# Patient Record
Sex: FEMALE | Race: WHITE | NOT HISPANIC OR LATINO | URBAN - METROPOLITAN AREA
[De-identification: names, ages, dates, MRNs, and addresses within clinical notes are randomized per-mention and may not be internally consistent; named-entity substitution may affect disease eponyms.]

---

## 2019-11-28 ENCOUNTER — EMERGENCY (EMERGENCY)
Facility: HOSPITAL | Age: 34
LOS: 1 days | Discharge: HOME | End: 2019-11-28
Admitting: EMERGENCY MEDICINE
Payer: COMMERCIAL

## 2019-11-28 VITALS
WEIGHT: 119.93 LBS | TEMPERATURE: 98 F | OXYGEN SATURATION: 90 % | HEART RATE: 133 BPM | HEIGHT: 63 IN | DIASTOLIC BLOOD PRESSURE: 99 MMHG | RESPIRATION RATE: 19 BRPM | SYSTOLIC BLOOD PRESSURE: 142 MMHG

## 2019-11-28 VITALS
SYSTOLIC BLOOD PRESSURE: 121 MMHG | DIASTOLIC BLOOD PRESSURE: 83 MMHG | HEART RATE: 101 BPM | TEMPERATURE: 98 F | OXYGEN SATURATION: 98 % | RESPIRATION RATE: 18 BRPM

## 2019-11-28 DIAGNOSIS — F19.10 OTHER PSYCHOACTIVE SUBSTANCE ABUSE, UNCOMPLICATED: ICD-10-CM

## 2019-11-28 LAB
ALBUMIN SERPL ELPH-MCNC: 4.2 G/DL — SIGNIFICANT CHANGE UP (ref 3.4–5)
ALP SERPL-CCNC: 49 U/L — SIGNIFICANT CHANGE UP (ref 40–120)
ALT FLD-CCNC: 22 U/L — SIGNIFICANT CHANGE UP (ref 12–42)
ANION GAP SERPL CALC-SCNC: 14 MMOL/L — SIGNIFICANT CHANGE UP (ref 9–16)
AST SERPL-CCNC: 17 U/L — SIGNIFICANT CHANGE UP (ref 15–37)
BASOPHILS # BLD AUTO: 0.07 K/UL — SIGNIFICANT CHANGE UP (ref 0–0.2)
BASOPHILS NFR BLD AUTO: 1.1 % — SIGNIFICANT CHANGE UP (ref 0–2)
BILIRUB SERPL-MCNC: 0.4 MG/DL — SIGNIFICANT CHANGE UP (ref 0.2–1.2)
BUN SERPL-MCNC: 13 MG/DL — SIGNIFICANT CHANGE UP (ref 7–23)
CALCIUM SERPL-MCNC: 8.8 MG/DL — SIGNIFICANT CHANGE UP (ref 8.5–10.5)
CHLORIDE SERPL-SCNC: 106 MMOL/L — SIGNIFICANT CHANGE UP (ref 96–108)
CO2 SERPL-SCNC: 24 MMOL/L — SIGNIFICANT CHANGE UP (ref 22–31)
CREAT SERPL-MCNC: 0.99 MG/DL — SIGNIFICANT CHANGE UP (ref 0.5–1.3)
EOSINOPHIL # BLD AUTO: 0.1 K/UL — SIGNIFICANT CHANGE UP (ref 0–0.5)
EOSINOPHIL NFR BLD AUTO: 1.5 % — SIGNIFICANT CHANGE UP (ref 0–6)
GLUCOSE SERPL-MCNC: 103 MG/DL — HIGH (ref 70–99)
HCT VFR BLD CALC: 39.6 % — SIGNIFICANT CHANGE UP (ref 34.5–45)
HGB BLD-MCNC: 13.7 G/DL — SIGNIFICANT CHANGE UP (ref 11.5–15.5)
IMM GRANULOCYTES NFR BLD AUTO: 0.3 % — SIGNIFICANT CHANGE UP (ref 0–1.5)
LYMPHOCYTES # BLD AUTO: 2.35 K/UL — SIGNIFICANT CHANGE UP (ref 1–3.3)
LYMPHOCYTES # BLD AUTO: 35.3 % — SIGNIFICANT CHANGE UP (ref 13–44)
MCHC RBC-ENTMCNC: 31.5 PG — SIGNIFICANT CHANGE UP (ref 27–34)
MCHC RBC-ENTMCNC: 34.6 GM/DL — SIGNIFICANT CHANGE UP (ref 32–36)
MCV RBC AUTO: 91 FL — SIGNIFICANT CHANGE UP (ref 80–100)
MONOCYTES # BLD AUTO: 0.41 K/UL — SIGNIFICANT CHANGE UP (ref 0–0.9)
MONOCYTES NFR BLD AUTO: 6.2 % — SIGNIFICANT CHANGE UP (ref 2–14)
NEUTROPHILS # BLD AUTO: 3.7 K/UL — SIGNIFICANT CHANGE UP (ref 1.8–7.4)
NEUTROPHILS NFR BLD AUTO: 55.6 % — SIGNIFICANT CHANGE UP (ref 43–77)
NRBC # BLD: 0 /100 WBCS — SIGNIFICANT CHANGE UP (ref 0–0)
PLATELET # BLD AUTO: 279 K/UL — SIGNIFICANT CHANGE UP (ref 150–400)
POTASSIUM SERPL-MCNC: 3.6 MMOL/L — SIGNIFICANT CHANGE UP (ref 3.5–5.3)
POTASSIUM SERPL-SCNC: 3.6 MMOL/L — SIGNIFICANT CHANGE UP (ref 3.5–5.3)
PROT SERPL-MCNC: 7.5 G/DL — SIGNIFICANT CHANGE UP (ref 6.4–8.2)
RBC # BLD: 4.35 M/UL — SIGNIFICANT CHANGE UP (ref 3.8–5.2)
RBC # FLD: 11.8 % — SIGNIFICANT CHANGE UP (ref 10.3–14.5)
SODIUM SERPL-SCNC: 144 MMOL/L — SIGNIFICANT CHANGE UP (ref 132–145)
WBC # BLD: 6.65 K/UL — SIGNIFICANT CHANGE UP (ref 3.8–10.5)
WBC # FLD AUTO: 6.65 K/UL — SIGNIFICANT CHANGE UP (ref 3.8–10.5)

## 2019-11-28 PROCEDURE — 99284 EMERGENCY DEPT VISIT MOD MDM: CPT

## 2019-11-28 PROCEDURE — 93010 ELECTROCARDIOGRAM REPORT: CPT | Mod: 59

## 2019-11-28 RX ORDER — SODIUM CHLORIDE 9 MG/ML
1000 INJECTION INTRAMUSCULAR; INTRAVENOUS; SUBCUTANEOUS ONCE
Refills: 0 | Status: COMPLETED | OUTPATIENT
Start: 2019-11-28 | End: 2019-11-28

## 2019-11-28 RX ORDER — ONDANSETRON 8 MG/1
4 TABLET, FILM COATED ORAL ONCE
Refills: 0 | Status: COMPLETED | OUTPATIENT
Start: 2019-11-28 | End: 2019-11-28

## 2019-11-28 RX ADMIN — SODIUM CHLORIDE 1000 MILLILITER(S): 9 INJECTION INTRAMUSCULAR; INTRAVENOUS; SUBCUTANEOUS at 21:20

## 2019-11-28 RX ADMIN — SODIUM CHLORIDE 1000 MILLILITER(S): 9 INJECTION INTRAMUSCULAR; INTRAVENOUS; SUBCUTANEOUS at 18:07

## 2019-11-28 RX ADMIN — SODIUM CHLORIDE 1000 MILLILITER(S): 9 INJECTION INTRAMUSCULAR; INTRAVENOUS; SUBCUTANEOUS at 20:00

## 2019-11-28 RX ADMIN — ONDANSETRON 4 MILLIGRAM(S): 8 TABLET, FILM COATED ORAL at 21:20

## 2019-11-28 RX ADMIN — SODIUM CHLORIDE 1000 MILLILITER(S): 9 INJECTION INTRAMUSCULAR; INTRAVENOUS; SUBCUTANEOUS at 20:55

## 2019-11-28 NOTE — ED PROVIDER NOTE - PATIENT PORTAL LINK FT
You can access the FollowMyHealth Patient Portal offered by [facility] by registering at the following website: [email or web address]. By joining [facility]’s FollowMyHealth portal, you will also be able to view your health information using other applications (apps) compatible with our system.

## 2019-11-28 NOTE — ED PROVIDER NOTE - CLINICAL SUMMARY MEDICAL DECISION MAKING FREE TEXT BOX
34 y/o presenting to ED after alcohol and sniffing .5 tab of percocet? Pt tachycardic on arrival however VSS. No neurological deficits. Will do basic labs, hydrate, and reassess. No clinical suspicion for alcohol withdrawal, cardiac or pulmonary, likely all related to drug and ETOH use.

## 2019-11-28 NOTE — ED PROVIDER NOTE - OBJECTIVE STATEMENT
32 y/o F w hx of hypothyroid on synthroid, allergic to amoxicillin and penicillin, BIBEMS to ED for medical evaluation. As per pt's boyfriend, after sniffing half a 20mg? percocet, pt was not responding and was unable to talk at ~5pm. Eventually she was able to respond but her eyes were going "in and out." Pt endorses she feels "out of it." When pt's boyfriend brought her downstairs to the medics, he noticed that she had a nose bleed. Admits to drinking 3 glasses of wine prior to drug use. Denies any seizures, urinary/bowel incontinence, chest pain, sob, N/V. As per boyfriend, pt normally drinks 1-2 glasses of wine after work.

## 2019-11-28 NOTE — ED ADULT NURSE NOTE - NSIMPLEMENTINTERV_GEN_ALL_ED
Implemented All Universal Safety Interventions:  [place] to call system. Call bell, personal items and telephone within reach. Instruct patient to call for assistance. Room bathroom lighting operational. Non-slip footwear when patient is off stretcher. Physically safe environment: no spills, clutter or unnecessary equipment. Stretcher in lowest position, wheels locked, appropriate side rails in place.

## 2019-11-28 NOTE — ED ADULT NURSE NOTE - GENITOURINARY ASSESSMENT
Last office visit- 5/2/16   Next office visit- No upcoming  Last refill-  5/15/17    Requested Prescriptions     Pending Prescriptions Disp Refills    montelukast (SINGULAIR) 10 mg tablet 30 Tab 5
WDL

## 2019-11-28 NOTE — ED ADULT TRIAGE NOTE - CHIEF COMPLAINT QUOTE
called 911 because she was drinking alcohol, taking recreational percocet and had an acute onset of epistaxis (no active bleeding noted) . Pt is tachycardic in triage, awake and alert following simple commands

## 2020-03-04 NOTE — ED ADULT TRIAGE NOTE - BMI (KG/M2)
21.2 O-Z Plasty Text: The defect edges were debeveled with a #15c scalpel blade.  Given the location of the defect, shape of the defect and the proximity to free margins an O-Z plasty (double transposition flap) was deemed most appropriate.  Using a sterile surgical marker, the appropriate transposition flaps were drawn incorporating the defect and placing the expected incisions within the relaxed skin tension lines where possible.    The area thus outlined was incised deep to adipose tissue with a #15 scalpel blade.  The skin margins were undermined to an appropriate distance in all directions utilizing iris scissors.  Hemostasis was achieved with electrocautery.  The flaps were then transposed into place, one clockwise and the other counterclockwise, and anchored with interrupted buried subcutaneous sutures.

## 2022-01-19 NOTE — ED PROVIDER NOTE - NSFOLLOWUPINSTRUCTIONS_ED_ALL_ED_FT
[Negative] : Genitourinary Substance Use Disorder  Substance use disorder occurs when a person's repeated use of drugs or alcohol interferes with his or her ability to be productive. This disorder can cause problems with mental and physical health. It can affect your ability to have healthy relationships, and it can keep you from being able to meet your responsibilities at work, home, or school. It can also lead to addiction, which is a condition in which the person cannot stop using the substance consistently for a period of time.  The most commonly abused substances include:  Alcohol.Tobacco.Marijuana.Stimulants, such as cocaine and methamphetamine.Hallucinogens, such as LSD and PCP.Opioids, such as some prescription pain medicines and heroin.What are the causes?  This condition may develop due to many complex social, psychological, or physical reasons, such as:  Stress.Abuse.Peer pressure.Anxiety or depression.What increases the risk?  This condition is more likely to develop in people who:  Use substances to cope with stress.Have been abused.Have a mental health disorder, such as depression.Have a family history of substance use disorder.What are the signs or symptoms?  Symptoms of this condition include:  Using the substance for longer periods of time or at a higher dosage than what is normal or intended.Having a lasting desire to use the substance.Being unable to slow down or stop your use of the substance.Spending an abnormal amount of time getting the substance, using the substance, or recovering from using the substance.Craving the substance.Using the substance in a way that interferes with work, school, social activities, and personal relationships.Using the substance even after having negative consequences, such as:  Health problems.Legal or financial troubles.Job loss.Broken relationships.Needing more and more of the substance to get the same effect (developing tolerance).Experiencing unpleasant symptoms if you do not use the substance (withdrawal).Using the substance to avoid withdrawal.How is this diagnosed?  This condition may be diagnosed based on:  A physical exam.Your history of substance use.Your symptoms. This includes:  How substance use affects your life.Changes in personality, behaviors, and mood.Having at least two symptoms of substance use disorder within a 12-month period.Health issues related to substance use, such as liver damage, shortness of breath, fatigue, cough, or heart problems.Blood or urine tests to screen for alcohol and drugs.How is this treated?  This condition may be treated by:  Stopping substance use safely. This may require taking medicines and being closely observed for several days.Taking part in group and individual counseling from mental health providers who help people with substance use disorder.Staying at a live-in (residential) treatment center for several days or weeks.Attending daily counseling sessions at a treatment center.Taking medicine as told by your health care provider:  To ease symptoms and prevent complications during withdrawal.To treat other mental health issues, such as depression or anxiety.To block cravings by causing the same effects as the substance.To block the effects of the substance or replace good sensations with unpleasant ones.Going to a support group to share your experience with others who are going through the same thing.Recovery can be a long process. Many people who undergo treatment start using the substance again after stopping (relapse). If you relapse, that does not mean that treatment will not work.  Follow these instructions at home:     Take over-the-counter and prescription medicines only as told by your health care provider.Do not use any drugs or alcohol.Attend support groups as needed. These groups, including 12-step programs like Alcoholics Anonymous and Narcotics Anonymous, are an important part of long-term recovery for many people.Keep all follow-up visits as told by your health care providers. This is important. This includes continuing to work with therapists and support groups.Contact a health care provider if:  You cannot take your medicines as told.Your symptoms get worse.You have trouble resisting the urge to use drugs or alcohol.Get help right away if you:  Relapse.Think that you may have taken too much of a drug. The hotline of the National Poison Control Center is (798) 712-1713.Have signs of an overdose. Symptoms include:  Chest pain.Confusion.Sleepiness or difficulty staying awake.Slowed breathing.Nausea or vomiting.A seizure.Have serious thoughts about hurting yourself or someone else.Drug overdose is an emergency. Do not wait to see if the symptoms will go away. Get medical help right away. Call your local emergency services (943 in the [place] Do not drive yourself to the hospital.   If you ever feel like you may hurt yourself or others, or have thoughts about taking your own life, get help right away. You can go to your nearest emergency department or call:   Your local emergency services (487 in the [place] A suicide crisis helpline, such as the National Suicide Prevention Lifeline at 1-238.565.3167. This is open 24 hours a day. Summary  Substance use disorder occurs when a person's repeated use of drugs or alcohol interferes with his or her ability to be productive. It can affect your ability to have healthy relationships, keep you from being able to meet your responsibilities at work, home, or school, and lead to addiction.Taking part in group and individual counseling from mental health providers is one possible treatment for people with substance use disorder.Recovery can be a long process. Many people who undergo treatment start using the substance again after stopping (relapse). A relapse does not mean that treatment will not work.Attend support groups as needed, such as Alcoholics Anonymous and Narcotics Anonymous. These groups are an important part of long-term recovery for many people.This information is not intended to replace advice given to you by your